# Patient Record
Sex: FEMALE | Race: WHITE | URBAN - METROPOLITAN AREA
[De-identification: names, ages, dates, MRNs, and addresses within clinical notes are randomized per-mention and may not be internally consistent; named-entity substitution may affect disease eponyms.]

---
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
## 2023-11-08 NOTE — IMPRESSION/PLAN
Impression: Myopia, bilateral: H52.13. Plan: Educated pt on proper CL hygiene, replacement of lenses, avoiding sleeping and swimming in lenses. Educated pt discontinue CL wear if eyes become painful, red, or irritated and RTC ASAP for evaluation. Pt expressed understanding. Finalized CLRx today. RTC 1 year for CE w/CLs, or PRN.
What Type Of Note Output Would You Prefer (Optional)?: Standard Output
How Severe Are Your Spot(S)?: mild
Have Your Spot(S) Been Treated In The Past?: has not been treated
Hpi Title: Evaluation of Skin Lesions